# Patient Record
Sex: MALE | URBAN - METROPOLITAN AREA
[De-identification: names, ages, dates, MRNs, and addresses within clinical notes are randomized per-mention and may not be internally consistent; named-entity substitution may affect disease eponyms.]

---

## 2021-05-14 ENCOUNTER — OFFICE VISIT (OUTPATIENT)
Dept: URBAN - METROPOLITAN AREA CLINIC 92 | Facility: CLINIC | Age: 56
End: 2021-05-14
Payer: OTHER GOVERNMENT

## 2021-05-14 ENCOUNTER — LAB OUTSIDE AN ENCOUNTER (OUTPATIENT)
Dept: URBAN - METROPOLITAN AREA CLINIC 92 | Facility: CLINIC | Age: 56
End: 2021-05-14

## 2021-05-14 DIAGNOSIS — R11.0 NAUSEA: ICD-10-CM

## 2021-05-14 DIAGNOSIS — Z86.010 PERSONAL HISTORY OF COLONIC POLYPS: ICD-10-CM

## 2021-05-14 DIAGNOSIS — R63.4 WEIGHT LOSS: ICD-10-CM

## 2021-05-14 DIAGNOSIS — K64.8 OTHER HEMORRHOIDS: ICD-10-CM

## 2021-05-14 DIAGNOSIS — K21.9 GASTROESOPHAGEAL REFLUX DISEASE, UNSPECIFIED WHETHER ESOPHAGITIS PRESENT: ICD-10-CM

## 2021-05-14 PROBLEM — 428283002: Status: ACTIVE | Noted: 2021-05-14

## 2021-05-14 PROCEDURE — 99204 OFFICE O/P NEW MOD 45 MIN: CPT | Performed by: INTERNAL MEDICINE

## 2021-05-14 RX ORDER — SODIUM, POTASSIUM,MAG SULFATES 17.5-3.13G
354ML SOLUTION, RECONSTITUTED, ORAL ORAL
Qty: 354 MILLILITER | Refills: 0 | OUTPATIENT
Start: 2021-05-14 | End: 2021-05-15

## 2021-05-14 RX ORDER — HYDROCORTISONE 25 MG/G
1 APPLICATION CREAM TOPICAL TWICE A DAY
Qty: 1 | Refills: 0 | OUTPATIENT
Start: 2021-05-14 | End: 2021-05-24

## 2021-05-14 NOTE — HPI-TODAY'S VISIT:
Nausea x 1yr. Intermittent. Lasts several weeks at a time. Worse with po intake. No emesis. +Early satiety. 15lb wt loss. Mild GERD. Recent labs normal per pt. No records for review. No GI bleeding, diarrhea constipation, dysphagia or NSAIDs. Does have hemorrhoidal irritation.  No previous EGD.  Colonoscopy 6 yrs ago with polyp. Repeat rec;d in 5 yrs.

## 2021-05-26 ENCOUNTER — OFFICE VISIT (OUTPATIENT)
Dept: URBAN - METROPOLITAN AREA CLINIC 98 | Facility: CLINIC | Age: 56
End: 2021-05-26

## 2021-06-21 ENCOUNTER — OFFICE VISIT (OUTPATIENT)
Dept: URBAN - METROPOLITAN AREA SURGERY CENTER 16 | Facility: SURGERY CENTER | Age: 56
End: 2021-06-21
Payer: OTHER GOVERNMENT

## 2021-06-21 DIAGNOSIS — Z86.010 H/O ADENOMATOUS POLYP OF COLON: ICD-10-CM

## 2021-06-21 DIAGNOSIS — K29.60 ADENOPAPILLOMATOSIS GASTRICA: ICD-10-CM

## 2021-06-21 DIAGNOSIS — D12.5 ADENOMA OF SIGMOID COLON: ICD-10-CM

## 2021-06-21 DIAGNOSIS — R11.0 CHRONIC NAUSEA: ICD-10-CM

## 2021-06-21 PROCEDURE — G8907 PT DOC NO EVENTS ON DISCHARG: HCPCS | Performed by: INTERNAL MEDICINE

## 2021-06-21 PROCEDURE — 43239 EGD BIOPSY SINGLE/MULTIPLE: CPT | Performed by: INTERNAL MEDICINE

## 2021-06-21 PROCEDURE — 45385 COLONOSCOPY W/LESION REMOVAL: CPT | Performed by: INTERNAL MEDICINE

## 2021-06-24 ENCOUNTER — WEB ENCOUNTER (OUTPATIENT)
Dept: URBAN - METROPOLITAN AREA CLINIC 13 | Facility: CLINIC | Age: 56
End: 2021-06-24

## 2022-08-22 ENCOUNTER — DASHBOARD ENCOUNTERS (OUTPATIENT)
Age: 57
End: 2022-08-22

## 2022-08-22 ENCOUNTER — OFFICE VISIT (OUTPATIENT)
Dept: URBAN - METROPOLITAN AREA CLINIC 92 | Facility: CLINIC | Age: 57
End: 2022-08-22
Payer: OTHER GOVERNMENT

## 2022-08-22 ENCOUNTER — TELEPHONE ENCOUNTER (OUTPATIENT)
Dept: URBAN - METROPOLITAN AREA CLINIC 92 | Facility: CLINIC | Age: 57
End: 2022-08-22

## 2022-08-22 VITALS
WEIGHT: 183 LBS | TEMPERATURE: 97.2 F | DIASTOLIC BLOOD PRESSURE: 72 MMHG | SYSTOLIC BLOOD PRESSURE: 114 MMHG | HEIGHT: 60 IN | BODY MASS INDEX: 35.93 KG/M2 | HEART RATE: 65 BPM

## 2022-08-22 DIAGNOSIS — R10.13 EPIGASTRIC ABDOMINAL PAIN: ICD-10-CM

## 2022-08-22 PROCEDURE — 99214 OFFICE O/P EST MOD 30 MIN: CPT | Performed by: INTERNAL MEDICINE

## 2022-08-22 RX ORDER — OMEPRAZOLE 40 MG/1
1 CAPSULE 30 MINUTES BEFORE MORNING MEAL CAPSULE, DELAYED RELEASE ORAL ONCE A DAY
Qty: 90 | Refills: 3 | OUTPATIENT
Start: 2022-08-22

## 2022-08-22 RX ORDER — OMEPRAZOLE 40 MG/1
1 CAPSULE 30 MINUTES BEFORE MORNING AND EVENING MEAL CAPSULE, DELAYED RELEASE ORAL ONCE A DAY
Qty: 60 | Refills: 11 | OUTPATIENT
Start: 2022-08-22

## 2022-08-22 NOTE — HPI-TODAY'S VISIT:
Nausea x 1yr. Intermittent. Lasts several weeks at a time. Worse with po intake. No emesis. +Early satiety. 15lb wt loss. Mild GERD. Recent labs normal per pt. No records for review. No GI bleeding, diarrhea constipation, dysphagia or NSAIDs. Does have hemorrhoidal irritation.  No previous EGD.  Colonoscopy 6 yrs ago with polyp. Repeat rec;d in 5 yrs.  ***8/22/22: About 2 weeks ago, went to the [place], and believes he ate something that did not agree with him and he developed diarrhea and severe abdominal pain.  He did not sleep for 4 nights due to his pain.  Also felt some nausea and distended.  His pain is persisting, despite returning to normal bowel habits.  His pain is more mild than in the past, denies any blood in his stool, but did note that his stool was dark for 1 day.  His pain was most severe in the epigastrium.  He has been taking omeprazole as needed, but has been taking it once a day 20 mg, for the past 1 week.  Denies NSAIDs.  He took an antiparasitic medication which did not improve his symptoms.  His last EGD was performed by Dr. Ashley in June 2021, demonstrated LA A esophagitis and a small hiatal hernia as well as gastric erythema, negative for H. pylori.

## 2022-09-29 ENCOUNTER — WEB ENCOUNTER (OUTPATIENT)
Dept: URBAN - METROPOLITAN AREA CLINIC 92 | Facility: CLINIC | Age: 57
End: 2022-09-29

## 2022-10-03 ENCOUNTER — LAB OUTSIDE AN ENCOUNTER (OUTPATIENT)
Dept: URBAN - METROPOLITAN AREA CLINIC 92 | Facility: CLINIC | Age: 57
End: 2022-10-03

## 2023-05-17 ENCOUNTER — ERX REFILL RESPONSE (OUTPATIENT)
Dept: URBAN - METROPOLITAN AREA CLINIC 92 | Facility: CLINIC | Age: 58
End: 2023-05-17

## 2023-05-17 RX ORDER — OMEPRAZOLE 40 MG/1
1 CAPSULE 30 MINUTES BEFORE MORNING MEAL CAPSULE, DELAYED RELEASE ORAL ONCE A DAY
Qty: 90 | Refills: 3 | OUTPATIENT

## 2023-05-17 RX ORDER — OMEPRAZOLE 40 MG/1
TAKE ONE CAPSULE BY MOUTH DAILY 30 MINUTES BEFORE MORNING MEAL CAPSULE, DELAYED RELEASE ORAL
Qty: 90 CAPSULE | Refills: 0 | OUTPATIENT

## 2023-08-02 NOTE — PHYSICAL EXAM CARDIOVASCULAR:
8/2/2023         RE: Vanesa Owen  [address] [facility] [id number]        Dear Colleague,    Thank you for referring your patient, Vanesa Owen, to the [facility]. Please see a copy of my visit note below.    Chief Complaint   Patient presents with     Annual Eye Exam        Patient has questions on spot on od eye she has had all her life- has not changed.     Last Eye Exam: 7-  Dilated Previously: Yes    What are you currently using to see?  Glasses, patient does not like rx in last pair of glasses,patient struggles with the glasses       Distance Vision Acuity: Noticed gradual change in both eyes    Near Vision Acuity: Not satisfied,small print harder    Eye Comfort: dry  Do you use eye drops? : Yes: systane and refresh not often  Occupation or Hobbies: NICU RN     Cristiane Gleason Optometric Assistant, A.B.O.C.      Medical, surgical and family histories reviewed and updated 8/2/2023.       OBJECTIVE: See Ophthalmology exam    ASSESSMENT:    ICD-10-CM    1. Examination of eyes and vision  Z01.00 EYE EXAM (SIMPLE-NONBILLABLE)      2. Presbyopia  H52.4 REFRACTION      3. Regular astigmatism of both eyes  H52.223 REFRACTION      4. Iris nevus, right  D31.41 EYE EXAM (SIMPLE-NONBILLABLE)     Adult Eye  Referral      5. Meibomian gland dysfunction  H02.889 EYE EXAM (SIMPLE-NONBILLABLE)      6. Dry eye syndrome of both eyes  H04.123 EYE EXAM (SIMPLE-NONBILLABLE)          PLAN:     Patient Instructions   Eyeglass prescription given.  Work space glasses are an option.    Referral to the [facility] eye clinic for baseline photos.    Heat to the eyes daily for 10-15 minutes nightly with warm washcloth or reusable gel masks from the pharmacy or  DVTel heat masks can be purchased at [employer].    Artificial tears- 1 drop both eyes 2-4 x daily.    We Love Eyes- tea tree oil eyelid  foaming cleanser to cleanse eyelids 2 x day or eye make up oil at  no edema,  no murmurs,  regular rate and rhythm , no edema. night.  [email or web address] for [employer] [email or web address].    Return in 1 year for a complete eye exam or sooner if needed..    Joel Buck, OD               Again, thank you for allowing me to participate in the care of your patient.        Sincerely,        Joel Buck, OD

## 2023-10-06 ENCOUNTER — TELEPHONE ENCOUNTER (OUTPATIENT)
Dept: URBAN - METROPOLITAN AREA CLINIC 92 | Facility: CLINIC | Age: 58
End: 2023-10-06

## 2023-10-06 ENCOUNTER — ERX REFILL RESPONSE (OUTPATIENT)
Dept: URBAN - METROPOLITAN AREA CLINIC 92 | Facility: CLINIC | Age: 58
End: 2023-10-06

## 2023-10-06 RX ORDER — OMEPRAZOLE 40 MG/1
TAKE ONE CAPSULE BY MOUTH DAILY 30 MINUTES BEFORE MORNING MEAL CAPSULE, DELAYED RELEASE ORAL
Qty: 30 | Refills: 1 | OUTPATIENT

## 2023-10-06 RX ORDER — OMEPRAZOLE 40 MG/1
TAKE ONE CAPSULE BY MOUTH DAILY 30 MINUTES BEFORE MORNING MEAL CAPSULE, DELAYED RELEASE ORAL
Qty: 90 CAPSULE | Refills: 0 | OUTPATIENT

## 2025-01-23 ENCOUNTER — TELEPHONE ENCOUNTER (OUTPATIENT)
Dept: URBAN - METROPOLITAN AREA CLINIC 92 | Facility: CLINIC | Age: 60
End: 2025-01-23